# Patient Record
Sex: MALE | Race: WHITE | NOT HISPANIC OR LATINO | URBAN - METROPOLITAN AREA
[De-identification: names, ages, dates, MRNs, and addresses within clinical notes are randomized per-mention and may not be internally consistent; named-entity substitution may affect disease eponyms.]

---

## 2017-08-24 ENCOUNTER — HOSPITAL ENCOUNTER (EMERGENCY)
Facility: MEDICAL CENTER | Age: 22
End: 2017-08-25
Attending: EMERGENCY MEDICINE

## 2017-08-24 ENCOUNTER — APPOINTMENT (OUTPATIENT)
Dept: RADIOLOGY | Facility: MEDICAL CENTER | Age: 22
End: 2017-08-24
Attending: EMERGENCY MEDICINE

## 2017-08-24 DIAGNOSIS — R51.9 NONINTRACTABLE HEADACHE, UNSPECIFIED CHRONICITY PATTERN, UNSPECIFIED HEADACHE TYPE: ICD-10-CM

## 2017-08-24 DIAGNOSIS — R50.9 FEVER, UNSPECIFIED FEVER CAUSE: ICD-10-CM

## 2017-08-24 LAB
ALBUMIN SERPL BCP-MCNC: 3.4 G/DL (ref 3.2–4.9)
ALBUMIN/GLOB SERPL: 1.2 G/DL
ALP SERPL-CCNC: 39 U/L (ref 30–99)
ALT SERPL-CCNC: 20 U/L (ref 2–50)
ANION GAP SERPL CALC-SCNC: 9 MMOL/L (ref 0–11.9)
AST SERPL-CCNC: 37 U/L (ref 12–45)
BASOPHILS # BLD AUTO: 0 % (ref 0–1.8)
BASOPHILS # BLD: 0 K/UL (ref 0–0.12)
BILIRUB SERPL-MCNC: 0.9 MG/DL (ref 0.1–1.5)
BUN SERPL-MCNC: 9 MG/DL (ref 8–22)
CALCIUM SERPL-MCNC: 8.1 MG/DL (ref 8.4–10.2)
CHLORIDE SERPL-SCNC: 100 MMOL/L (ref 96–112)
CK SERPL-CCNC: 77 U/L (ref 0–154)
CO2 SERPL-SCNC: 26 MMOL/L (ref 20–33)
CREAT SERPL-MCNC: 0.75 MG/DL (ref 0.5–1.4)
EOSINOPHIL # BLD AUTO: 0 K/UL (ref 0–0.51)
EOSINOPHIL NFR BLD: 0 % (ref 0–6.9)
ERYTHROCYTE [DISTWIDTH] IN BLOOD BY AUTOMATED COUNT: 41.9 FL (ref 35.9–50)
GFR SERPL CREATININE-BSD FRML MDRD: >60 ML/MIN/1.73 M 2
GLOBULIN SER CALC-MCNC: 2.9 G/DL (ref 1.9–3.5)
GLUCOSE SERPL-MCNC: 96 MG/DL (ref 65–99)
HCT VFR BLD AUTO: 38 % (ref 42–52)
HGB BLD-MCNC: 13.4 G/DL (ref 14–18)
LACTATE BLD-SCNC: 1.19 MMOL/L (ref 0.5–2)
LYMPHOCYTES # BLD AUTO: 1.26 K/UL (ref 1–4.8)
LYMPHOCYTES NFR BLD: 34 % (ref 22–41)
MANUAL DIFF BLD: NORMAL
MCH RBC QN AUTO: 33.8 PG (ref 27–33)
MCHC RBC AUTO-ENTMCNC: 35.3 G/DL (ref 33.7–35.3)
MCV RBC AUTO: 96 FL (ref 81.4–97.8)
MONOCYTES # BLD AUTO: 0.19 K/UL (ref 0–0.85)
MONOCYTES NFR BLD AUTO: 5 % (ref 0–13.4)
NEUTROPHILS # BLD AUTO: 2.26 K/UL (ref 1.82–7.42)
NEUTROPHILS NFR BLD: 57 % (ref 44–72)
NEUTS BAND NFR BLD MANUAL: 4 % (ref 0–10)
NRBC # BLD AUTO: 0 K/UL
NRBC BLD AUTO-RTO: 0 /100 WBC
PLATELET # BLD AUTO: 135 K/UL (ref 164–446)
PLATELET BLD QL SMEAR: NORMAL
PMV BLD AUTO: 9.6 FL (ref 9–12.9)
POTASSIUM SERPL-SCNC: 3.8 MMOL/L (ref 3.6–5.5)
PROT SERPL-MCNC: 6.3 G/DL (ref 6–8.2)
RBC # BLD AUTO: 3.96 M/UL (ref 4.7–6.1)
RBC BLD AUTO: NORMAL
SODIUM SERPL-SCNC: 135 MMOL/L (ref 135–145)
SPECIMEN DRAWN FROM PATIENT: NORMAL
VARIANT LYMPHS BLD QL SMEAR: NORMAL
WBC # BLD AUTO: 3.7 K/UL (ref 4.8–10.8)

## 2017-08-24 PROCEDURE — 85027 COMPLETE CBC AUTOMATED: CPT

## 2017-08-24 PROCEDURE — 36415 COLL VENOUS BLD VENIPUNCTURE: CPT

## 2017-08-24 PROCEDURE — 96375 TX/PRO/DX INJ NEW DRUG ADDON: CPT

## 2017-08-24 PROCEDURE — 82550 ASSAY OF CK (CPK): CPT

## 2017-08-24 PROCEDURE — 700111 HCHG RX REV CODE 636 W/ 250 OVERRIDE (IP): Performed by: EMERGENCY MEDICINE

## 2017-08-24 PROCEDURE — A9270 NON-COVERED ITEM OR SERVICE: HCPCS | Performed by: EMERGENCY MEDICINE

## 2017-08-24 PROCEDURE — 99285 EMERGENCY DEPT VISIT HI MDM: CPT

## 2017-08-24 PROCEDURE — 70450 CT HEAD/BRAIN W/O DYE: CPT

## 2017-08-24 PROCEDURE — 85007 BL SMEAR W/DIFF WBC COUNT: CPT

## 2017-08-24 PROCEDURE — 80053 COMPREHEN METABOLIC PANEL: CPT

## 2017-08-24 PROCEDURE — 83605 ASSAY OF LACTIC ACID: CPT

## 2017-08-24 PROCEDURE — 87040 BLOOD CULTURE FOR BACTERIA: CPT

## 2017-08-24 PROCEDURE — 700102 HCHG RX REV CODE 250 W/ 637 OVERRIDE(OP): Performed by: EMERGENCY MEDICINE

## 2017-08-24 PROCEDURE — 86790 VIRUS ANTIBODY NOS: CPT | Mod: 91

## 2017-08-24 PROCEDURE — 99284 EMERGENCY DEPT VISIT MOD MDM: CPT

## 2017-08-24 RX ORDER — ACETAMINOPHEN 325 MG/1
975 TABLET ORAL ONCE
Status: COMPLETED | OUTPATIENT
Start: 2017-08-24 | End: 2017-08-24

## 2017-08-24 RX ORDER — ONDANSETRON 2 MG/ML
4 INJECTION INTRAMUSCULAR; INTRAVENOUS ONCE
Status: COMPLETED | OUTPATIENT
Start: 2017-08-24 | End: 2017-08-24

## 2017-08-24 RX ADMIN — ONDANSETRON 4 MG: 2 INJECTION INTRAMUSCULAR; INTRAVENOUS at 22:38

## 2017-08-24 RX ADMIN — ACETAMINOPHEN 975 MG: 325 TABLET, FILM COATED ORAL at 22:38

## 2017-08-24 ASSESSMENT — PAIN SCALES - GENERAL: PAINLEVEL_OUTOF10: 5

## 2017-08-24 NOTE — ED AVS SNAPSHOT
Home Care Instructions                                                                                                                Gulshan Rasheed   MRN: [id number]    Department:  [facility], Emergency Dept   Date of Visit:  8/24/2017            [facility], Emergency Dept    [address]    [place] [address]    Phone:  [phone]      You were seen by     Gulshan Christy II, M.D.      Your Diagnosis Was     Fever, unspecified fever cause     R50.9       These are the medications you received during your hospitalization from 08/24/2017 2034 to 08/25/2017 0326     Date/Time Order Dose Route Action    08/24/2017 2238 ondansetron (ZOFRAN) syringe/vial injection 4 mg 4 mg Intravenous Given    08/24/2017 2238 acetaminophen (TYLENOL) tablet 975 mg 975 mg Oral Given    08/25/2017 0138 cefTRIAXone (ROCEPHIN) injection 2 g 2 g Intravenous Given    08/25/2017 0137 metoclopramide (REGLAN) injection 10 mg 10 mg Intravenous Given    08/25/2017 0137 diphenhydrAMINE (BENADRYL) injection 25 mg 25 mg Intravenous Given    08/25/2017 0137 NS infusion 500 mL 500 mL Intravenous New Bag      Follow-up Information     1. Follow up with I have placed a request for our [facility] schedulers to schedule a follow up appointment with one of our medicine doctors within the next 1-2 weeks. .        2. Follow up with [facility], Emergency Dept.    Specialty:  Emergency Medicine    Why:  If symptoms worsen, high fevers, unable to tolerate food or drink, blood in stools, other bleeding symptoms or worsening rash    Contact information    [id number] [id number] [phone]      Medication Information     Review all of your home medications and newly ordered medications with your primary doctor and/or pharmacist as soon as possible. Follow medication instructions as directed by your doctor and/or pharmacist.     Please keep your complete  medication list with you and share with your physician. Update the information when medications are discontinued, doses are changed, or new medications (including over-the-counter products) are added; and carry medication information at all times in the event of emergency situations.               Medication List      ASK your doctor about these medications        Instructions    Morning Afternoon Evening Bedtime    ondansetron 4 MG Tabs tablet   Commonly known as:  ZOFRAN        Take 1 Tab by mouth every 6 hours as needed for Nausea/Vomiting.   Dose:  4 mg                                Procedures and tests performed during your visit     Procedure/Test Number of Times Performed    BLOOD CULTURE 2    CARDIAC MONITORING 1    CBC WITH DIFFERENTIAL 1    COMP METABOLIC PANEL 1    CREATINE KINASE 1    CSF CELL COUNT 2    CSF CULTURE 1    CSF GLUCOSE 1    CSF PROTEIN 1    CT-HEAD W/O 1    DIFFERENTIAL MANUAL 1    ESTIMATED GFR 1    GRAM STAIN 1    HIV RNA ULTRAQUANT 1    HIV-1 (RNA AND DNA) BY PCR, QUAL 1    IV SALINE LOCK 1    LACTIC ACID 1    MORPHOLOGY 1    PLATELET ESTIMATE 1    URINALYSIS CULTURE, IF INDICATED 1    WEST NILE VIRUS ANTIBODY 1        Discharge Instructions       Lumbar Puncture  A lumbar puncture, or spinal tap, is a procedure in which a small amount of the fluid that surrounds the brain and spinal cord is removed and examined. The fluid is called the cerebrospinal fluid. This procedure may be done to:   · Help diagnose various problems, such as meningitis, encephalitis, multiple sclerosis, and AIDS.    · Remove fluid and relieve pressure that occurs with certain types of headaches.    · Look for bleeding within the brain and spinal cord areas (central nervous system).    · Place medicine into the spinal fluid.    LET YOUR HEALTH CARE PROVIDER KNOW ABOUT:  · Any allergies you have.  · All medicines you are taking, including vitamins, herbs, eye drops, creams, and over-the-counter  medicines.  · Previous problems you or members of your family have had with the use of anesthetics.  · Any blood disorders you have.  · Previous surgeries you have had.  · Medical conditions you have.  RISKS AND COMPLICATIONS  Generally, this is a safe procedure. However, as with any procedure, complications can occur. Possible complications include:   · Spinal headache. This is a severe headache that occurs when there is a leak of spinal fluid. A spinal headache causes discomfort but is not dangerous. If it persists, another procedure may be done to treat the headache.  · Bleeding. This most often occurs in people with bleeding disorders. These are disorders in which the blood does not clot normally.    · Infection at the insertion site that can spread to the bone or spinal fluid.   · Formation of a spinal cord tumor (rare).  · Brain herniation or movement of the brain into the spinal cord (rare).  · Inability to move (extremely rare).  BEFORE THE PROCEDURE  · You may have blood tests done. These tests can help tell how well your kidneys and liver are working. They can also show how well your blood clots.    · If you take blood thinners (anticoagulant medicine), ask your health care provider if and when you should stop taking them.    · Your health care provider may order a CT scan of your brain.  · Make arrangements for someone to drive you home after the procedure.      PROCEDURE  · You will be positioned so that the spaces between the bones of the spine (vertebrae) are as wide as possible. This will make it easier to pass the needle into the spinal canal.   · Depending on your age and size, you may lie on your side, curled up with your knees under your chin. Or, you may sit with your head resting on a pillow that is placed at waist level.  · The skin covering the lower back (or lumbar region) will be cleaned.    · The skin may be numbed with medicine.  · You may be given pain medicine or a medicine to help you  "relax (sedative).    · A small needle will be inserted in the skin until it enters the space that contains the spinal fluid. The needle will not enter the spinal cord.    · The spinal fluid will be collected into tubes.    · The needle will be withdrawn, and a bandage will be placed on the site.    AFTER THE PROCEDURE  · You will remain lying down for 1 hour or for as long as your health care provider suggests.    · The spinal fluid will be sent to a laboratory to be examined. The results of the examination may be available before you go home.  · A test, called a culture, may be taken of the spinal fluid if your health care provider thinks you have an infection. If cultures were taken for exam, the results will usually be available in a couple of days.        This information is not intended to replace advice given to you by your health care provider. Make sure you discuss any questions you have with your health care provider.     Document Released: 12/15/2001 Document Revised: 10/08/2014 Document Reviewed: 08/25/2014  [employer] Interactive Patient Education ©2016 [employer]  Viral Syndrome  You or your child has Viral Syndrome. It is the most common infection causing \"colds\" and infections in the nose, throat, sinuses, and breathing tubes. Sometimes the infection causes nausea, vomiting, or diarrhea. The germ that causes the infection is a virus. No antibiotic or other medicine will kill it. There are medicines that you can take to make you or your child more comfortable.   HOME CARE INSTRUCTIONS   · Rest in bed until you start to feel better.   · If you have diarrhea or vomiting, eat small amounts of crackers and toast. Soup is helpful.   · Do not give aspirin or medicine that contains aspirin to children.   · Only take over-the-counter or prescription medicines for pain, discomfort, or fever as directed by your caregiver.   SEEK IMMEDIATE MEDICAL CARE IF:   · You or your child has not improved within one week. "   · You or your child has pain that is not at least partially relieved by over-the-counter medicine.   · Thick, colored mucus or blood is coughed up.   · Discharge from the nose becomes thick yellow or green.   · Diarrhea or vomiting gets worse.   · There is any major change in your or your child's condition.   · You or your child develops a skin rash, stiff neck, severe headache, or are unable to hold down food or fluid.   · You or your child has an oral temperature above 102° F (38.9° C), not controlled by medicine.   · Your baby is older than 3 months with a rectal temperature of 102° F (38.9° C) or higher.   · Your baby is 3 months old or younger with a rectal temperature of 100.4° F (38° C) or higher.   Document Released: 12/03/2007 Document Revised: 03/11/2013 Document Reviewed: 12/03/2008  ExitCare® Patient Information ©2013 [employer].          Patient Information     Patient Information    Following emergency treatment: all patient requiring follow-up care must return either to a private physician or a clinic if your condition worsens before you are able to obtain further medical attention, please return to the emergency room.     Billing Information    At [facility], we work to make the billing process streamlined for our patients.  Our Representatives are here to answer any questions you may have regarding your hospital bill.  If you have insurance coverage and have supplied your insurance information to us, we will submit a claim to your insurer on your behalf.  Should you have any questions regarding your bill, we can be reached online or by phone as follows:  Online: You are able pay your bills online or live chat with our representatives about any billing questions you may have. We are here to help Monday - Friday from 8:00am to 7:30pm and 9:00am - 12:00pm on Saturdays.  Please visit [email or web address]  for more information.   Phone:  [phone] or  [phone]    Please note that your emergency physician, surgeon, pathologist, radiologist, anesthesiologist, and other specialists are not employed by [facility] and will therefore bill separately for their services.  Please contact them directly for any questions concerning their bills at the numbers below:     Emergency Physician Services:  [phone]  [facility]:  [phone]  [facility]:  [phone]  [facility]:  [phone]    1. Your final bill may vary from the amount quoted upon discharge if all procedures are not complete at that time, or if your doctor has additional procedures of which we are not aware. You will receive an additional bill if you return to the Emergency Department at [facility] for suture removal regardless of the facility of which the sutures were placed.     2. Please arrange for settlement of this account at the emergency registration.    3. All self-pay accounts are due in full at the time of treatment.  If you are unable to meet this obligation then payment is expected within 4-5 days.     4. If you have had radiology studies (CT, X-ray, Ultrasound, MRI), you have received a preliminary result during your emergency department visit. Please contact the radiology department [phone] to receive a copy of your final result. Please discuss the Final result with your primary physician or with the follow up physician provided.     Crisis Hotline:  [place] Crisis Hotline:  6-790-XOLFKJY or 1-982.529.7880  [place] Crisis Hotline:    1-267.641.5238 or 903-326-9615         ED Discharge Follow Up Questions    1. In order to provide you with very good care, we would like to follow up with a phone call in the next few days.  May we have your permission to contact you?     YES /  NO    2. What is the best phone number to call you? (       )_____-__________    3. What is the best time to call you?      Morning  /  Afternoon  /   Evening                   Patient Signature:  ____________________________________________________________    Date:  ____________________________________________________________

## 2017-08-24 NOTE — ED AVS SNAPSHOT
8/25/2017    Gulshan Rasheed  [address] [address]  [place] [address]    Dear Gulshan:    [facility] wants to ensure your discharge home is safe and you or your loved ones have had all of your questions answered regarding your care after you leave the hospital.    Below is a list of resources and contact information should you have any questions regarding your hospital stay, follow-up instructions, or active medical symptoms.    Questions or Concerns Regarding… Contact   Medical Questions Related to Your Discharge  (7 days a week, 8am-5pm) Contact a Nurse Care Coordinator   [phone]   Medical Questions Not Related to Your Discharge  (24 hours a day / 7 days a week)  Contact the Nurse Health Line   [phone]    Medications or Discharge Instructions Refer to your discharge packet   or contact your [facility] Primary Care Provider   [phone]   Follow-up Appointment(s) Schedule your appointment via [employer]   or contact Scheduling [phone]   Billing Review your statement via [employer]  or contact Billing [phone]   Medical Records Review your records via [employer]   or contact Medical Records [phone]     You may receive a telephone call within two days of discharge. This call is to make certain you understand your discharge instructions and have the opportunity to have any questions answered. You can also easily access your medical information, test results and upcoming appointments via the [employer] free online health management tool. You can learn more and sign up at [email or web address]. For assistance setting up your [employer] account, please call [phone].    Once again, we want to ensure your discharge home is safe and that you have a clear understanding of any next steps in your care. If you have any questions or concerns, please do not hesitate to contact us, we are here for you. Thank you for choosing [facility] for your healthcare needs.    Sincerely,    Your [facility] Healthcare Team

## 2017-08-24 NOTE — ED AVS SNAPSHOT
Walkabout Access Code: [email or web address]  Expires: 9/24/2017  3:25 AM    Your email address is not on file at [employer].  Email Addresses are required for you to sign up for Walkabout, please contact [phone] to verify your personal information and to provide your email address prior to attempting to register for Walkabout.    Gulshan Rasheed  [address] [place] [address]    Walkabout  A secure, online tool to manage your health information     [employer]’s Walkabout® is a secure, online tool that connects you to your personalized health information from the privacy of your home -- day or night - making it very easy for you to manage your healthcare. Once the activation process is completed, you can even access your medical information using the Walkabout brandy, which is available for free in the Apple Brandy store or Google Play store.     To learn more about Walkabout, visit [email or web address]    There are two levels of access available (as shown below):   My Chart Features  [facility] Primary Care Doctor [facility]  Specialists [facility]  Urgent  Care Non-[facility] Primary Care Doctor   Email your healthcare team securely and privately 24/7 X X X    Manage appointments: schedule your next appointment; view details of past/upcoming appointments X      Request prescription refills. X      View recent personal medical records, including lab and immunizations X X X X   View health record, including health history, allergies, medications X X X X   Read reports about your outpatient visits, procedures, consult and ER notes X X X X   See your discharge summary, which is a recap of your hospital and/or ER visit that includes your diagnosis, lab results, and care plan X X  X     How to register for SharePlowt:  Once your e-mail address has been verified, follow the following steps to sign up for [employer]t.     1. Go to  [email or web address]  2. Click on the Sign Up Now box, which takes you to the New Member Sign Up page. You  will need to provide the following information:  a. Enter your [employer] Access Code exactly as it appears at the top of this page. (You will not need to use this code after you’ve completed the sign-up process. If you do not sign up before the expiration date, you must request a new code.)   b. Enter your date of birth.   c. Enter your home email address.   d. Click Submit, and follow the next screen’s instructions.  3. Create a [employer] ID. This will be your [employer] login ID and cannot be changed, so think of one that is secure and easy to remember.  4. Create a [employer] password. You can change your password at any time.  5. Enter your Password Reset Question and Answer. This can be used at a later time if you forget your password.   6. Enter your e-mail address. This allows you to receive e-mail notifications when new information is available in [employer].  7. Click Sign Up. You can now view your health information.    For assistance activating your [employer] account, call [phone]

## 2017-08-25 ENCOUNTER — PATIENT OUTREACH (OUTPATIENT)
Dept: HEALTH INFORMATION MANAGEMENT | Facility: OTHER | Age: 22
End: 2017-08-25

## 2017-08-25 VITALS
BODY MASS INDEX: 20.08 KG/M2 | HEART RATE: 62 BPM | RESPIRATION RATE: 17 BRPM | TEMPERATURE: 98.6 F | WEIGHT: 132.5 LBS | HEIGHT: 68 IN | DIASTOLIC BLOOD PRESSURE: 66 MMHG | SYSTOLIC BLOOD PRESSURE: 115 MMHG | OXYGEN SATURATION: 96 %

## 2017-08-25 LAB
APPEARANCE UR: CLEAR
BILIRUB UR QL STRIP.AUTO: NEGATIVE
BURR CELLS/RBC NFR CSF MANUAL: 0 %
BURR CELLS/RBC NFR CSF MANUAL: 0 %
CLARITY CSF: CLEAR
CLARITY CSF: CLEAR
COLOR CSF: COLORLESS
COLOR CSF: COLORLESS
COLOR SPUN CSF: COLORLESS
COLOR SPUN CSF: COLORLESS
COLOR UR: YELLOW
CULTURE IF INDICATED INDCX: NO UA CULTURE
GLUCOSE CSF-MCNC: 54 MG/DL (ref 40–80)
GLUCOSE UR STRIP.AUTO-MCNC: NEGATIVE MG/DL
GRAM STN SPEC: NORMAL
KETONES UR STRIP.AUTO-MCNC: 15 MG/DL
LEUKOCYTE ESTERASE UR QL STRIP.AUTO: NEGATIVE
LYMPHOCYTES NFR CSF: 88 %
MICRO URNS: ABNORMAL
MONONUC CELLS NFR CSF: 12 %
NITRITE UR QL STRIP.AUTO: NEGATIVE
PH UR STRIP.AUTO: 6.5 [PH]
PROT CSF-MCNC: 22 MG/DL (ref 15–45)
PROT UR QL STRIP: NEGATIVE MG/DL
RBC # CSF: 0 CELLS/UL
RBC # CSF: 1 CELLS/UL
RBC UR QL AUTO: NEGATIVE
SIGNIFICANT IND 70042: NORMAL
SITE SITE: NORMAL
SOURCE SOURCE: NORMAL
SP GR UR STRIP.AUTO: <=1.005
SPECIMEN VOL CSF: 5 ML
SPECIMEN VOL CSF: 5 ML
TUBE # CSF: 1
TUBE # CSF: 4
WBC # CSF: 2 CELLS/UL (ref 0–10)
WBC # CSF: 3 CELLS/UL (ref 0–10)

## 2017-08-25 PROCEDURE — 87205 SMEAR GRAM STAIN: CPT

## 2017-08-25 PROCEDURE — 62270 DX LMBR SPI PNXR: CPT

## 2017-08-25 PROCEDURE — 96375 TX/PRO/DX INJ NEW DRUG ADDON: CPT

## 2017-08-25 PROCEDURE — 87070 CULTURE OTHR SPECIMN AEROBIC: CPT

## 2017-08-25 PROCEDURE — 87535 HIV-1 PROBE&REVERSE TRNSCRPJ: CPT

## 2017-08-25 PROCEDURE — 96365 THER/PROPH/DIAG IV INF INIT: CPT

## 2017-08-25 PROCEDURE — 700111 HCHG RX REV CODE 636 W/ 250 OVERRIDE (IP): Performed by: EMERGENCY MEDICINE

## 2017-08-25 PROCEDURE — 89051 BODY FLUID CELL COUNT: CPT

## 2017-08-25 PROCEDURE — 84157 ASSAY OF PROTEIN OTHER: CPT

## 2017-08-25 PROCEDURE — 700105 HCHG RX REV CODE 258: Performed by: EMERGENCY MEDICINE

## 2017-08-25 PROCEDURE — 82945 GLUCOSE OTHER FLUID: CPT

## 2017-08-25 PROCEDURE — 81003 URINALYSIS AUTO W/O SCOPE: CPT

## 2017-08-25 PROCEDURE — 87536 HIV-1 QUANT&REVRSE TRNSCRPJ: CPT

## 2017-08-25 RX ORDER — SODIUM CHLORIDE 9 MG/ML
500 INJECTION, SOLUTION INTRAVENOUS ONCE
Status: COMPLETED | OUTPATIENT
Start: 2017-08-25 | End: 2017-08-25

## 2017-08-25 RX ORDER — METOCLOPRAMIDE HYDROCHLORIDE 5 MG/ML
10 INJECTION INTRAMUSCULAR; INTRAVENOUS ONCE
Status: COMPLETED | OUTPATIENT
Start: 2017-08-25 | End: 2017-08-25

## 2017-08-25 RX ORDER — DIPHENHYDRAMINE HYDROCHLORIDE 50 MG/ML
25 INJECTION INTRAMUSCULAR; INTRAVENOUS ONCE
Status: COMPLETED | OUTPATIENT
Start: 2017-08-25 | End: 2017-08-25

## 2017-08-25 RX ORDER — VANCOMYCIN HYDROCHLORIDE 500 MG/10ML
2 INJECTION, POWDER, LYOPHILIZED, FOR SOLUTION INTRAVENOUS ONCE
Status: DISCONTINUED | OUTPATIENT
Start: 2017-08-25 | End: 2017-08-25

## 2017-08-25 RX ORDER — CEFTRIAXONE 2 G/1
2 INJECTION, POWDER, FOR SOLUTION INTRAMUSCULAR; INTRAVENOUS ONCE
Status: COMPLETED | OUTPATIENT
Start: 2017-08-25 | End: 2017-08-25

## 2017-08-25 RX ADMIN — SODIUM CHLORIDE 500 ML: 9 INJECTION, SOLUTION INTRAVENOUS at 01:37

## 2017-08-25 RX ADMIN — METOCLOPRAMIDE 10 MG: 5 INJECTION, SOLUTION INTRAMUSCULAR; INTRAVENOUS at 01:37

## 2017-08-25 RX ADMIN — CEFTRIAXONE 2 G: 2 INJECTION, POWDER, FOR SOLUTION INTRAMUSCULAR; INTRAVENOUS at 01:38

## 2017-08-25 RX ADMIN — DIPHENHYDRAMINE HYDROCHLORIDE 25 MG: 50 INJECTION, SOLUTION INTRAMUSCULAR; INTRAVENOUS at 01:37

## 2017-08-25 ASSESSMENT — ENCOUNTER SYMPTOMS
LOSS OF CONSCIOUSNESS: 0
COUGH: 0
HEADACHES: 1
SHORTNESS OF BREATH: 0
CHILLS: 1
FEVER: 1
NAUSEA: 1
DIZZINESS: 0
NECK PAIN: 1
EYE PAIN: 0
SORE THROAT: 0
FOCAL WEAKNESS: 0
VOMITING: 1
BACK PAIN: 0
PHOTOPHOBIA: 1

## 2017-08-25 NOTE — ED NOTES
"Chief Complaint   Patient presents with   • Fever     101f  yesterday per pt    • N/V     intermittent vomiting . Reports single episode Wednesday of vomiting blood.    • Generalized Body Aches     x 1 week    • Abdominal Pain     Beginning approx 2100. \" maybe i have constipation\".    • Bloody Stools     Single \" black\" BM yesterday.        "

## 2017-08-25 NOTE — ED PROVIDER NOTES
"  ED Provider Note    Scribed for VIDAL Goyal II* by Gulshan Christy II. 8/24/2017  10:31 PM    Means of Arrival: POV  History obtained by: patient and mother  Limitations: none    CHIEF COMPLAINT  Chief Complaint   Patient presents with   • Fever     101f  yesterday per pt    • N/V     intermittent vomiting . Reports single episode Wednesday of vomiting blood.    • Generalized Body Aches     x 1 week    • Abdominal Pain     Beginning approx 2100. \" maybe i have constipation\".    • Bloody Stools     Single \" black\" BM yesterday.        HPI  Gulshan Rasheed is a 22 y.o. Male otherwise healthy who presents with concerns of fever, headache, nausea, emesis, body aches since 4 days ago. He says he was camping over the weekend. He says he was sunburned and initially thought that body aches were from sunburn and being outside camping over the weekend. However over the next couple days he had a headache that would not improve with Tylenol or Motrin. Reports headache getting worse. He says he was unable to tolerate by mouth for the past 3 days. He went to [facility] emergency department yesterday. He says \"they didn't do anything for me there.\" When probed further, he says that they did do blood work and gave him 2 L of fluid. He was not told what the cause of his symptoms were. He describes headache has diffuse more intense in the back radiating to his neck, constant, throbbing.    REVIEW OF SYSTEMS  Review of Systems   Constitutional: Positive for fever, chills and malaise/fatigue.   HENT: Negative for sore throat.    Eyes: Positive for photophobia. Negative for pain.   Respiratory: Negative for cough and shortness of breath.    Cardiovascular: Negative for chest pain.   Gastrointestinal: Positive for nausea and vomiting.        Reports streaks of blood in emesis earlier this week     Musculoskeletal: Positive for joint pain and neck pain. Negative for back pain.   Skin: Positive for rash.        Sunburn on " "back     Neurological: Positive for headaches. Negative for dizziness, focal weakness and loss of consciousness.   Psychiatric/Behavioral:        No confusion     All other systems reviewed and are negative.    See HPI for further details.    PAST MEDICAL HISTORY       SOCIAL HISTORY  Social History     Social History Main Topics   • Smoking status: Current Every Day Smoker -- 1.00 packs/day   • Smokeless tobacco: Not on file   • Alcohol Use: Yes      Comment: few drinks weekly   • Drug Use: No   • Sexual Activity: Not on file       SURGICAL HISTORY  patient denies any surgical history    CURRENT MEDICATIONS  Home Medications     **Home medications have not yet been reviewed for this encounter**          ALLERGIES  No Known Allergies    PHYSICAL EXAM  VITAL SIGNS: /66 mmHg  Pulse 68  Temp(Src) 37 °C (98.6 °F)  Resp 17  Ht 1.727 m (5' 7.99\")  Wt 60.1 kg (132 lb 7.9 oz)  BMI 20.15 kg/m2    Pulse ox interpretation: I interpret this pulse ox as normal.  Constitutional: Alert in no apparent distress.  HENT: No signs of trauma, Bilateral external ears normal, Nose normal.   Eyes: Pupils are equal and reactive, Conjunctiva normal, Non-icteric. Photophobia.  Neck: Pain with range of motion of the neck.,  No stridor.   Lymphatic: Posterior cervical lymphadenopathy.  Cardiovascular: Regular rate and rhythm, no murmurs.   Thorax & Lungs: Normal breath sounds, No respiratory distress, No wheezing, No chest tenderness.   Abdomen: Bowel sounds normal, Soft, No tenderness, No masses, No pulsatile masses. No peritoneal signs. Occult stool negative (control good).  Skin: Warm, Dry, No erythema. Papular rash on right side of face, red, does not latha. He also has a fine and faint papular rash is red not raised on the lateral sides of flank. Also with superficial sunburn on back.   Back: No bony tenderness, No CVA tenderness.   Extremities: Intact distal pulses, No edema, No tenderness, No cyanosis.  Musculoskeletal: Good " range of motion in all major joints. No tenderness to palpation or major deformities noted.   Neurologic: Alert , Normal motor function, Normal sensory function, No focal deficits noted.   Psychiatric: Affect normal, Judgment normal, Mood normal.     DIAGNOSTIC STUDIES / PROCEDURES    EKG  none    LABS  Pertinent Labs & Imaging studies reviewed. (See chart for details)    RADIOLOGY  Pertinent Labs & Imaging studies reviewed. (See chart for details)    COURSE & MEDICAL DECISION MAKING  Pertinent Labs & Imaging studies reviewed. (See chart for details)    10:31 PM This is a 22 y.o. male otherwise healthy who presents with complaints of 4 days of worsening headache, fevers, body aches and the differential diagnosis includes viral syndrome, meningitis, migraine headache, rhabdomyolysis, HIV viral prodrome. Concerning combinations of symptoms that included rash, headache, fever. Ordered for CBC, CMP, CK, lactic acid, blood cultures, CT head, planned for LP to evaluate. Patient will be treated with Zofran and Tylenol for his symptoms.     1:51 AM CT within normal limits. CBC abnormal. White blood count 3.7, mildly low H/H, low platelet 135. Bicarb is normal. Lactic acid normal. LP was performed see procedure note. CSF results pending.    LUMBAR PUNCTURE PROCEDURE NOTE  Patient identification was confirmed and consent was obtained. The procedure  was performed at 100 by Dr. Christy.  Indication: headache, neck pain, fevers  Puncture Site: L4/L5  Sterile procedures observed yes  Patient position: left lateral decubitus  Needle size: 20  Anesthetic used (type and amt): 2% lidocaine with epinephrine, 5cc  Intracranial pressure: 19  Amount CSF collected: 6cc  Color of CSF collected: crystal clear  Site anesthetized, puncture made at indicated site, CSF collected and sent for further lab testing (see lab ). Pt tolerated procedure well without complications. Instructions for care discussed verbally and pt provided  with additional written instructions for homecare and f/u.    3:24 AM  CSF results reassuring. Only 3 white cells and 1 red cell. No organisms on Gram stain. Protein and glucose within normal limits. His headache is drastically improved after treatment with Reglan and Benadryl. I discussed abnormalities on CBC. I told him I sent a serum assay West Nile virus and HIV. I do not think he meets indications for admissions given his vital signs are normal here, his blood counts are not critically low, and he has a negative lumbar puncture.    I suspect this presentation is most likely due to a viral syndrome, I just do not know what type of virus. I looked at the rash on his face a 2nd time tonight and not convinced that this isn't rash from a viral infection. Has the parents they could be more consistent with acne. Rash that was briefly documented at his flanks is no longer visible. I called [facility] scheduling line and request that he be set up with a primary care appointment for next week for reassessment of a normal CBC and viral studies sent.    The patient will not drink alcohol nor drive with prescribed medications. The patient will return for worsening symptoms and is stable at the time of discharge. The patient verbalizes understanding and will comply.    FINAL IMPRESSION  1. Viral syndrome

## 2017-08-25 NOTE — DISCHARGE INSTRUCTIONS
Lumbar Puncture  A lumbar puncture, or spinal tap, is a procedure in which a small amount of the fluid that surrounds the brain and spinal cord is removed and examined. The fluid is called the cerebrospinal fluid. This procedure may be done to:   · Help diagnose various problems, such as meningitis, encephalitis, multiple sclerosis, and AIDS.    · Remove fluid and relieve pressure that occurs with certain types of headaches.    · Look for bleeding within the brain and spinal cord areas (central nervous system).    · Place medicine into the spinal fluid.    LET YOUR HEALTH CARE PROVIDER KNOW ABOUT:  · Any allergies you have.  · All medicines you are taking, including vitamins, herbs, eye drops, creams, and over-the-counter medicines.  · Previous problems you or members of your family have had with the use of anesthetics.  · Any blood disorders you have.  · Previous surgeries you have had.  · Medical conditions you have.  RISKS AND COMPLICATIONS  Generally, this is a safe procedure. However, as with any procedure, complications can occur. Possible complications include:   · Spinal headache. This is a severe headache that occurs when there is a leak of spinal fluid. A spinal headache causes discomfort but is not dangerous. If it persists, another procedure may be done to treat the headache.  · Bleeding. This most often occurs in people with bleeding disorders. These are disorders in which the blood does not clot normally.    · Infection at the insertion site that can spread to the bone or spinal fluid.   · Formation of a spinal cord tumor (rare).  · Brain herniation or movement of the brain into the spinal cord (rare).  · Inability to move (extremely rare).  BEFORE THE PROCEDURE  · You may have blood tests done. These tests can help tell how well your kidneys and liver are working. They can also show how well your blood clots.    · If you take blood thinners (anticoagulant medicine), ask your health care provider if  "and when you should stop taking them.    · Your health care provider may order a CT scan of your brain.  · Make arrangements for someone to drive you home after the procedure.      PROCEDURE  · You will be positioned so that the spaces between the bones of the spine (vertebrae) are as wide as possible. This will make it easier to pass the needle into the spinal canal.   · Depending on your age and size, you may lie on your side, curled up with your knees under your chin. Or, you may sit with your head resting on a pillow that is placed at waist level.  · The skin covering the lower back (or lumbar region) will be cleaned.    · The skin may be numbed with medicine.  · You may be given pain medicine or a medicine to help you relax (sedative).    · A small needle will be inserted in the skin until it enters the space that contains the spinal fluid. The needle will not enter the spinal cord.    · The spinal fluid will be collected into tubes.    · The needle will be withdrawn, and a bandage will be placed on the site.    AFTER THE PROCEDURE  · You will remain lying down for 1 hour or for as long as your health care provider suggests.    · The spinal fluid will be sent to a laboratory to be examined. The results of the examination may be available before you go home.  · A test, called a culture, may be taken of the spinal fluid if your health care provider thinks you have an infection. If cultures were taken for exam, the results will usually be available in a couple of days.        This information is not intended to replace advice given to you by your health care provider. Make sure you discuss any questions you have with your health care provider.     Document Released: 12/15/2001 Document Revised: 10/08/2014 Document Reviewed: 08/25/2014  [employer] Interactive Patient Education ©2016 [employer]  Viral Syndrome  You or your child has Viral Syndrome. It is the most common infection causing \"colds\" and infections in " the nose, throat, sinuses, and breathing tubes. Sometimes the infection causes nausea, vomiting, or diarrhea. The germ that causes the infection is a virus. No antibiotic or other medicine will kill it. There are medicines that you can take to make you or your child more comfortable.   HOME CARE INSTRUCTIONS   · Rest in bed until you start to feel better.   · If you have diarrhea or vomiting, eat small amounts of crackers and toast. Soup is helpful.   · Do not give aspirin or medicine that contains aspirin to children.   · Only take over-the-counter or prescription medicines for pain, discomfort, or fever as directed by your caregiver.   SEEK IMMEDIATE MEDICAL CARE IF:   · You or your child has not improved within one week.   · You or your child has pain that is not at least partially relieved by over-the-counter medicine.   · Thick, colored mucus or blood is coughed up.   · Discharge from the nose becomes thick yellow or green.   · Diarrhea or vomiting gets worse.   · There is any major change in your or your child's condition.   · You or your child develops a skin rash, stiff neck, severe headache, or are unable to hold down food or fluid.   · You or your child has an oral temperature above 102° F (38.9° C), not controlled by medicine.   · Your baby is older than 3 months with a rectal temperature of 102° F (38.9° C) or higher.   · Your baby is 3 months old or younger with a rectal temperature of 100.4° F (38° C) or higher.   Document Released: 12/03/2007 Document Revised: 03/11/2013 Document Reviewed: 12/03/2008  [employer]® Patient Information ©2013 [employer].

## 2017-08-26 LAB — HIV 1 PRO DNA BLD QL NAA+PROBE: DETECTED

## 2017-08-28 LAB
BACTERIA CSF CULT: NORMAL
GRAM STN SPEC: NORMAL
SIGNIFICANT IND 70042: NORMAL
SITE SITE: NORMAL
SOURCE SOURCE: NORMAL

## 2017-08-28 NOTE — ED NOTES
"ED Positive Culture Follow-up/Notification Note:    Date: 8/28/17     Patient seen in the ED on 8/24/2017 for fever, headache, nausea, emesis and body aches x 4 days.   1. Fever, unspecified fever cause    2. Nonintractable headache, unspecified chronicity pattern, unspecified headache type       Discharge Medication List as of 8/25/2017  3:26 AM          Allergies: Review of patient's allergies indicates no known allergies.     Final cultures:   Results     Procedure Component Value Units Date/Time    CSF CULTURE [[id number]] Collected:  08/25/17 0120    Order Status:  Completed Specimen:  CSF from Tap Updated:  08/28/17 0853     Gram Stain Result No organisms seen.     Significant Indicator NEG     Source CSF     Site TAP     CSF Culture No growth at 72 hours.    BLOOD CULTURE [[id number]] Collected:  08/24/17 2300    Order Status:  Completed Specimen:  Blood from Peripheral Updated:  08/25/17 0711     Significant Indicator NEG     Source BLD     Site PERIPHERAL     Blood Culture --     No Growth    Note: Blood cultures are incubated for 5 days and  are monitored continuously.Positive blood cultures  are called to the RN and reported as soon as  they are identified.  Blood culture testing and Gram stain, if indicated, are  performed at [facility], [address] [place] [address].  Positive blood cultures are  sent to [facility], [address] [place] [address], for organism identification and  susceptibility testing.      Narrative:       Per Hospital Policy: Only change Specimen Src: to \"Line\" if  specified by physician order.    BLOOD CULTURE [[id number]] Collected:  08/24/17 2238    Order Status:  Completed Specimen:  Blood from Peripheral Updated:  08/25/17 0711     Significant Indicator NEG     Source BLD     Site PERIPHERAL     Blood Culture --     No Growth    Note: Blood cultures are incubated for 5 days and  are monitored continuously.Positive blood " "cultures  are called to the RN and reported as soon as  they are identified.  Blood culture testing and Gram stain, if indicated, are  performed at [facility], [address] [place] [address].  Positive blood cultures are  sent to [facility], [address] [place] [address], for organism identification and  susceptibility testing.      Narrative:       Per Hospital Policy: Only change Specimen Src: to \"Line\" if  specified by physician order.    URINALYSIS CULTURE, IF INDICATED [[id number]]  (Abnormal) Collected:  08/25/17 0250    Order Status:  Completed Specimen:  Urine from Urine, Clean Catch Updated:  08/25/17 0258     Color Yellow     Character Clear     Specific Gravity <=1.005     Ph 6.5     Glucose Negative mg/dL      Ketones 15 (A) mg/dL      Protein Negative mg/dL      Bilirubin Negative     Nitrite Negative     Leukocyte Esterase Negative     Occult Blood Negative     Micro Urine Req see below     Comment: Microscopic examination not performed when specimen is clear  and chemically negative for protein, blood, leukocyte esterase  and nitrite.          Culture Indicated No UA Culture     GRAM STAIN [[id number]] Collected:  08/25/17 0120    Order Status:  Completed Specimen:  CSF Updated:  08/25/17 0237     Significant Indicator .     Source CSF     Site TAP     Gram Stain Result No organisms seen.    GRAM STAIN ONLY [[id number]]     Order Status:  Canceled Specimen:  CSF from Tap           Plan:   HIV PCR positive. No HIV antibody testing done. HIV RNA ultraquantitative test pending. No blood in the lab remaining to order 4th Generation Ag/Ab test.     I have spoken with Dr. Morales and Janie Mock who covers [facility] ([phone]) regarding this case. They recommend contacting the patient to discuss preliminary result and encourage follow up with the [facility] HIV clinic.     I have contacted the patient who states that he does not utilized IV drugs, " has not had any blood transfusions, but does have sex with men.  He states he was last tested in March 2017 and was negative. He has been provided with the preliminary HIV test and encouraged to schedule an appointment with the [facility] ([phone]).  He is aware that the viral load testing is not yet back and that I will call him back with this result.     Jazzmine Palomino

## 2017-08-30 LAB
BACTERIA BLD CULT: NORMAL
BACTERIA BLD CULT: NORMAL
SIGNIFICANT IND 70042: NORMAL
SIGNIFICANT IND 70042: NORMAL
SITE SITE: NORMAL
SITE SITE: NORMAL
SOURCE SOURCE: NORMAL
SOURCE SOURCE: NORMAL

## 2017-08-31 LAB — WNV IGM SER QL IA: NORMAL

## 2020-12-04 ENCOUNTER — HOSPITAL ENCOUNTER (OUTPATIENT)
Dept: LAB | Facility: MEDICAL CENTER | Age: 25
End: 2020-12-04
Attending: STUDENT IN AN ORGANIZED HEALTH CARE EDUCATION/TRAINING PROGRAM
Payer: MEDICAID

## 2020-12-04 LAB
ALBUMIN SERPL BCP-MCNC: 4.6 G/DL (ref 3.2–4.9)
ALBUMIN/GLOB SERPL: 2.1 G/DL
ALP SERPL-CCNC: 65 U/L (ref 30–99)
ALT SERPL-CCNC: 21 U/L (ref 2–50)
ANION GAP SERPL CALC-SCNC: 8 MMOL/L (ref 7–16)
AST SERPL-CCNC: 24 U/L (ref 12–45)
BASOPHILS # BLD AUTO: 0.5 % (ref 0–1.8)
BASOPHILS # BLD: 0.03 K/UL (ref 0–0.12)
BILIRUB SERPL-MCNC: 1.8 MG/DL (ref 0.1–1.5)
BUN SERPL-MCNC: 17 MG/DL (ref 8–22)
CALCIUM SERPL-MCNC: 9.4 MG/DL (ref 8.5–10.5)
CHLORIDE SERPL-SCNC: 104 MMOL/L (ref 96–112)
CO2 SERPL-SCNC: 26 MMOL/L (ref 20–33)
CREAT SERPL-MCNC: 0.93 MG/DL (ref 0.5–1.4)
EOSINOPHIL # BLD AUTO: 0.23 K/UL (ref 0–0.51)
EOSINOPHIL NFR BLD: 3.7 % (ref 0–6.9)
ERYTHROCYTE [DISTWIDTH] IN BLOOD BY AUTOMATED COUNT: 44.2 FL (ref 35.9–50)
GLOBULIN SER CALC-MCNC: 2.2 G/DL (ref 1.9–3.5)
GLUCOSE SERPL-MCNC: 108 MG/DL (ref 65–99)
HBV SURFACE AB SERPL IA-ACNC: 138 MIU/ML (ref 0–10)
HCT VFR BLD AUTO: 48.7 % (ref 42–52)
HGB BLD-MCNC: 17.5 G/DL (ref 14–18)
IMM GRANULOCYTES # BLD AUTO: 0 K/UL (ref 0–0.11)
IMM GRANULOCYTES NFR BLD AUTO: 0 % (ref 0–0.9)
LYMPHOCYTES # BLD AUTO: 1.97 K/UL (ref 1–4.8)
LYMPHOCYTES NFR BLD: 31.8 % (ref 22–41)
MCH RBC QN AUTO: 35.8 PG (ref 27–33)
MCHC RBC AUTO-ENTMCNC: 35.9 G/DL (ref 33.7–35.3)
MCV RBC AUTO: 99.6 FL (ref 81.4–97.8)
MONOCYTES # BLD AUTO: 0.47 K/UL (ref 0–0.85)
MONOCYTES NFR BLD AUTO: 7.6 % (ref 0–13.4)
NEUTROPHILS # BLD AUTO: 3.5 K/UL (ref 1.82–7.42)
NEUTROPHILS NFR BLD: 56.4 % (ref 44–72)
NRBC # BLD AUTO: 0 K/UL
NRBC BLD-RTO: 0 /100 WBC
PLATELET # BLD AUTO: 224 K/UL (ref 164–446)
PMV BLD AUTO: 10.2 FL (ref 9–12.9)
POTASSIUM SERPL-SCNC: 4 MMOL/L (ref 3.6–5.5)
PROT SERPL-MCNC: 6.8 G/DL (ref 6–8.2)
RBC # BLD AUTO: 4.89 M/UL (ref 4.7–6.1)
SODIUM SERPL-SCNC: 138 MMOL/L (ref 135–145)
WBC # BLD AUTO: 6.2 K/UL (ref 4.8–10.8)

## 2020-12-04 PROCEDURE — 36415 COLL VENOUS BLD VENIPUNCTURE: CPT

## 2020-12-04 PROCEDURE — 86592 SYPHILIS TEST NON-TREP QUAL: CPT

## 2020-12-04 PROCEDURE — 87491 CHLMYD TRACH DNA AMP PROBE: CPT

## 2020-12-04 PROCEDURE — 85025 COMPLETE CBC W/AUTO DIFF WBC: CPT

## 2020-12-04 PROCEDURE — 86706 HEP B SURFACE ANTIBODY: CPT

## 2020-12-04 PROCEDURE — 87536 HIV-1 QUANT&REVRSE TRNSCRPJ: CPT

## 2020-12-04 PROCEDURE — 86361 T CELL ABSOLUTE COUNT: CPT

## 2020-12-04 PROCEDURE — 86708 HEPATITIS A ANTIBODY: CPT

## 2020-12-04 PROCEDURE — 87591 N.GONORRHOEAE DNA AMP PROB: CPT

## 2020-12-04 PROCEDURE — 80053 COMPREHEN METABOLIC PANEL: CPT

## 2020-12-04 PROCEDURE — 86780 TREPONEMA PALLIDUM: CPT

## 2020-12-05 LAB
C TRACH DNA SPEC QL NAA+PROBE: NEGATIVE
N GONORRHOEA DNA SPEC QL NAA+PROBE: NEGATIVE
RPR SER QL: NON REACTIVE
SPECIMEN SOURCE: NORMAL
TREPONEMA PALLIDUM IGG+IGM AB [PRESENCE] IN SERUM OR PLASMA BY IMMUNOASSAY: REACTIVE

## 2020-12-06 LAB
CD3+CD4+ CELLS # BLD: 1091 CELLS/UL (ref 430–1800)
CD3+CD4+ CELLS NFR BLD: 53 % (ref 32–64)
HAV AB SER QL IA: POSITIVE
IMMUNODEFICIENCY MARKERS SPEC-IMP: NORMAL

## 2020-12-18 LAB — T PALLIDUM AB SER QL AGGL: REACTIVE

## 2020-12-23 LAB
HIV-1 NAAT (COPIES/ML) L204479A: NOT DETECTED CPY/ML
HIV-1 NAAT (LOG COPIES/ML) L295410: NOT DETECTED LOG CPY/ML
HIV1 RNA SERPL QL NAA+PROBE: NOT DETECTED

## 2022-03-14 DIAGNOSIS — A51.5 SYPHILIS, EARLY LATENT: ICD-10-CM

## 2022-03-14 DIAGNOSIS — Z72.51 HIGH RISK SEXUAL BEHAVIOR, UNSPECIFIED TYPE: ICD-10-CM

## 2022-03-14 DIAGNOSIS — Z21 ASYMPTOMATIC HUMAN IMMUNODEFICIENCY VIRUS INFECTION (HCC): ICD-10-CM

## 2022-03-14 DIAGNOSIS — D83.1: ICD-10-CM

## 2022-03-14 DIAGNOSIS — Z51.81 ENCOUNTER FOR THERAPEUTIC DRUG MONITORING: ICD-10-CM

## 2022-03-14 RX ORDER — BICTEGRAVIR SODIUM, EMTRICITABINE, AND TENOFOVIR ALAFENAMIDE FUMARATE 50; 200; 25 MG/1; MG/1; MG/1
TABLET ORAL
COMMUNITY
Start: 2020-09-16 | End: 2022-03-14 | Stop reason: SDUPTHER

## 2022-03-14 NOTE — TELEPHONE ENCOUNTER
VOICEMAIL  1. Caller Name: Gulshan Rasheed                      Call Back Number: [phone]    2. Message: pt lm requesting a refill on his Biktarvy, he has been out for 5-6 days now.    3. Patient approves office to leave a detailed voicemail/MyChart message: yes

## 2022-03-15 RX ORDER — BICTEGRAVIR SODIUM, EMTRICITABINE, AND TENOFOVIR ALAFENAMIDE FUMARATE 50; 200; 25 MG/1; MG/1; MG/1
1 TABLET ORAL DAILY
Qty: 90 TABLET | Refills: 3 | Status: SHIPPED | OUTPATIENT
Start: 2022-03-15

## 2022-03-15 NOTE — TELEPHONE ENCOUNTER
Rx Sent to [facility] - have also ordered lab work   Okay to do not fasting   May need to move appt tomorrow for a later date until labs are done unless it is not an HIV-related issue

## 2022-03-16 ENCOUNTER — TELEPHONE (OUTPATIENT)
Dept: INTERNAL MEDICINE | Facility: OTHER | Age: 27
End: 2022-03-16

## 2022-03-16 ENCOUNTER — OFFICE VISIT (OUTPATIENT)
Dept: INTERNAL MEDICINE | Facility: OTHER | Age: 27
End: 2022-03-16
Payer: MEDICAID

## 2022-03-16 VITALS
TEMPERATURE: 97.7 F | WEIGHT: 132.2 LBS | OXYGEN SATURATION: 96 % | SYSTOLIC BLOOD PRESSURE: 128 MMHG | DIASTOLIC BLOOD PRESSURE: 74 MMHG | BODY MASS INDEX: 20.11 KG/M2 | HEART RATE: 80 BPM

## 2022-03-16 DIAGNOSIS — D89.9 DISORDER OF IMMUNE SYSTEM (HCC): ICD-10-CM

## 2022-03-16 DIAGNOSIS — Z21 ASYMPTOMATIC HUMAN IMMUNODEFICIENCY VIRUS INFECTION (HCC): ICD-10-CM

## 2022-03-16 DIAGNOSIS — Z51.81 ENCOUNTER FOR THERAPEUTIC DRUG LEVEL MONITORING: ICD-10-CM

## 2022-03-16 DIAGNOSIS — Z79.899 OTHER LONG TERM (CURRENT) DRUG THERAPY: ICD-10-CM

## 2022-03-16 PROBLEM — A51.5 EARLY SYPHILIS, LATENT: Status: ACTIVE | Noted: 2020-12-18

## 2022-03-16 PROBLEM — Z00.00 ENCOUNTER FOR GENERAL ADULT MEDICAL EXAMINATION WITHOUT ABNORMAL FINDINGS: Status: ACTIVE | Noted: 2020-12-17

## 2022-03-16 PROBLEM — Z72.51 HIGH RISK SEXUAL BEHAVIOR: Status: ACTIVE | Noted: 2020-09-16

## 2022-03-16 PROBLEM — G43.109 OPHTHALMIC MIGRAINE: Status: ACTIVE | Noted: 2021-04-22

## 2022-03-16 PROCEDURE — 99214 OFFICE O/P EST MOD 30 MIN: CPT | Mod: GC | Performed by: STUDENT IN AN ORGANIZED HEALTH CARE EDUCATION/TRAINING PROGRAM

## 2022-03-16 ASSESSMENT — ANXIETY QUESTIONNAIRES
1. FEELING NERVOUS, ANXIOUS, OR ON EDGE: MORE THAN HALF THE DAYS
GAD7 TOTAL SCORE: 16
2. NOT BEING ABLE TO STOP OR CONTROL WORRYING: NEARLY EVERY DAY
5. BEING SO RESTLESS THAT IT IS HARD TO SIT STILL: MORE THAN HALF THE DAYS
6. BECOMING EASILY ANNOYED OR IRRITABLE: MORE THAN HALF THE DAYS
4. TROUBLE RELAXING: MORE THAN HALF THE DAYS
7. FEELING AFRAID AS IF SOMETHING AWFUL MIGHT HAPPEN: MORE THAN HALF THE DAYS
2. NOT BEING ABLE TO STOP OR CONTROL WORRYING: NEARLY EVERY DAY
5. BEING SO RESTLESS THAT IT IS HARD TO SIT STILL: MORE THAN HALF THE DAYS
2. NOT BEING ABLE TO STOP OR CONTROL WORRYING: NEARLY EVERY DAY
GAD7 TOTAL SCORE: 16
4. TROUBLE RELAXING: MORE THAN HALF THE DAYS
6. BECOMING EASILY ANNOYED OR IRRITABLE: MORE THAN HALF THE DAYS
5. BEING SO RESTLESS THAT IT IS HARD TO SIT STILL: MORE THAN HALF THE DAYS
IF YOU CHECKED OFF ANY PROBLEMS ON THIS QUESTIONNAIRE, HOW DIFFICULT HAVE THESE PROBLEMS MADE IT FOR YOU TO DO YOUR WORK, TAKE CARE OF THINGS AT HOME, OR GET ALONG WITH OTHER PEOPLE: VERY DIFFICULT
3. WORRYING TOO MUCH ABOUT DIFFERENT THINGS: NEARLY EVERY DAY
7. FEELING AFRAID AS IF SOMETHING AWFUL MIGHT HAPPEN: MORE THAN HALF THE DAYS
IF YOU CHECKED OFF ANY PROBLEMS ON THIS QUESTIONNAIRE, HOW DIFFICULT HAVE THESE PROBLEMS MADE IT FOR YOU TO DO YOUR WORK, TAKE CARE OF THINGS AT HOME, OR GET ALONG WITH OTHER PEOPLE: VERY DIFFICULT
GAD7 TOTAL SCORE: 16
3. WORRYING TOO MUCH ABOUT DIFFERENT THINGS: NEARLY EVERY DAY
IF YOU CHECKED OFF ANY PROBLEMS ON THIS QUESTIONNAIRE, HOW DIFFICULT HAVE THESE PROBLEMS MADE IT FOR YOU TO DO YOUR WORK, TAKE CARE OF THINGS AT HOME, OR GET ALONG WITH OTHER PEOPLE: VERY DIFFICULT
4. TROUBLE RELAXING: MORE THAN HALF THE DAYS
1. FEELING NERVOUS, ANXIOUS, OR ON EDGE: MORE THAN HALF THE DAYS
7. FEELING AFRAID AS IF SOMETHING AWFUL MIGHT HAPPEN: MORE THAN HALF THE DAYS
6. BECOMING EASILY ANNOYED OR IRRITABLE: MORE THAN HALF THE DAYS
3. WORRYING TOO MUCH ABOUT DIFFERENT THINGS: NEARLY EVERY DAY
1. FEELING NERVOUS, ANXIOUS, OR ON EDGE: MORE THAN HALF THE DAYS

## 2022-03-16 ASSESSMENT — PATIENT HEALTH QUESTIONNAIRE - PHQ9
SUM OF ALL RESPONSES TO PHQ QUESTIONS 1-9: 19
CLINICAL INTERPRETATION OF PHQ2 SCORE: 4
5. POOR APPETITE OR OVEREATING: 1 - SEVERAL DAYS

## 2022-03-16 ASSESSMENT — FIBROSIS 4 INDEX: FIB4 SCORE: 0.68

## 2022-03-16 NOTE — PATIENT INSTRUCTIONS
Please get your Viral Load and other labs done today prior to picking your prescription that was sent yesterday to [facility] pharmacy.    Have  Repeat viral load lab checked in May 1st week prior to your follow up appointment with Dr. Sierra in 2 months    Follow up in 2 months

## 2022-03-16 NOTE — TELEPHONE ENCOUNTER
DOCUMENTATION OF PAR STATUS:    1. Name of Medication & Dose: Biktarvy -25    2. Name of Prescription Coverage Company & phone #: [employer] [phone] id [id number]    3. Date Prior Auth Submitted: 03/16/2022    4. What information was given to obtain insurance decision? Spoke to [employer] rep Yu pending case  case number [id number]    5. Prior Auth Letter  Denied please see media scan denied    6. Action Taken: Pharmacy/Patient Notified: Yes

## 2022-03-17 NOTE — ASSESSMENT & PLAN NOTE
Currently, the patient is asymptomatic in regards to their HIV disease and not manifesting any opportunistic infections or complications of their HIV specific therapy.    Currently on Biktarvy cocktail for couple of years  Last CD4 count was 1091 and undetectable viral load from 12/2020  Missed medication for the last one week as he ran out  Plan:  - Check Viral Load today to monitor for rebound viremia as pt. is off medication for about a week.  - Prescription refilled and sent to pharmacy. Pt informs being told by pharmacy that prior auth is required, notified MA who will work on it  - Recheck Viral load again in 6 weeks to monitor while on therapy  - Followup in clinic in 8 weeks.

## 2022-03-17 NOTE — PROGRESS NOTES
Established Patient    Chief Complaint   Patient presents with   • HIV Positive/AIDS     Patient will be having labs done this week       HPI: Gulshan Rasheed is a 27 y.o. male with past medical history significant for asymptomatic HIV presents to the clinic for follow up visit.    HIV - He was established in our clinic in 2020, was seen again in 4/2021 and subsequently had no further follow up. He is currently on Biktarvy(bictegravir-emtricitabine) for the last couple of years. He informs missing his medication for the last one week as he ran out but otherwise compliant. Last CD4 count was 1091 and undetectable viral load from 12/2020. He was given lab slips at the time of his visit in 4/2021 but could not get them completed. He presents requesting for refill of his medications. He reports tolerating medication well with no side affects. He denies any oral thrush, rash, myalgias, fever/chills, headache, sore throat, fatigue or swollen lymph nodes. Labs were ordered yesterday and refill sent to pharmacy by Dr. Velasquez.      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 90 Tablet 3     No current facility-administered medications for this visit.       ROS: As per HPI. Additional pertinent systems as noted below.  Constitutional:  Negative for fever, chills   HENT:  Negative for ear pain, sore throat, runny nose   Cardiovascular:  Negative for chest pain, palpitations    Respiratory:  Negative for cough, sputum production, shortness of breath   Gastrointestinal: Negative for abdominal pain, nausea, vomiting, diarrhea, or blood in stools   Genitourinary: Negative for dysuria, flank pain and hematuria  Skin: Negative for rash, itching  Extremities: Negative for leg swelling   Neurologic: Negative for headaches, dizziness, seizures, weakness     /74 (BP Location: Left arm, Patient Position: Sitting, BP Cuff Size: Adult)   Pulse 80   Temp 36.5 °C (97.7 °F) (Temporal)   Wt 60 kg (132 lb 3.2 oz)   SpO2 96%   BMI 20.11 kg/m²     Physical Exam   Constitutional:  Well developed, well nourished. Not in acute distress   HENT:  Normocephalic, Atraumatic, Oropharynx is pink and moist. No oral exudates, Nose normal   Eyes:  EOMI, Conjunctiva normal, No discharge. PERRLA   Neck:  Normal range of motion, No cervical lymphadenopathy. No thyromegaly.   Cardiovascular:  Regular rate and rhythm, No pedal edema, Intact distal pulses   Respiratory: Clear to auscultation bilaterally, No use of accessory muscles   Gastrointestinal: Bowel sounds normal, Soft, No tenderness, No palpable masses/hepatosplenomegaly   Musculoskeletal: No cyanosis or clubbing, normal ROM     Note: I have reviewed all pertinent labs and diagnostic tests associated with this visit with specific comments listed under the assessment and plan below    Assessment and Plan  Asymptomatic HIV  Currently, the patient is asymptomatic in regards to their HIV disease and not manifesting any opportunistic infections or complications of their HIV specific therapy.    Currently on Biktarvy cocktail for couple of years  Last CD4 count was 1091 and undetectable viral load from 12/2020  Missed medication for the last one week as he ran out  Plan:  - Check Viral Load today to monitor for rebound viremia as pt. is off medication for about a week.  - Prescription refilled and sent to pharmacy. Pt informs being told by pharmacy that prior  auth is required, notified MA who will work on it  - Recheck Viral load again in 6 weeks to monitor while on therapy  - Followup in clinic in 8 weeks.    Other long term (current) drug therapy  Pt. currently is highly adherent with their HIV cocktail(Biktarvy) and is not experiencing any adverse, drug-related side effects or significant laboratory abnormalities.      T cell immunodeficiency defect  PE shows no signs of any opportunistic infections and CD4 count is stable above a level for which no drug prophylaxis is indicated to prevent opportunistic infections.      Followup: Return in about 2 months (around 5/16/2022).    Patient seen with attending.    Signed by: Ligia Sanchez M.D.    Please note that this dictation was created using voice recognition software. I have made every reasonable attempt to correct obvious errors, but I expect that there are errors of grammar and possibly content that I did not discover before finalizing the note.

## 2022-03-17 NOTE — ASSESSMENT & PLAN NOTE
PE shows no signs of any opportunistic infections and CD4 count is stable above a level for which no drug prophylaxis is indicated to prevent opportunistic infections.

## 2022-03-17 NOTE — ASSESSMENT & PLAN NOTE
Pt. currently is highly adherent with their HIV cocktail(Biktarvy) and is not experiencing any adverse, drug-related side effects or significant laboratory abnormalities.

## 2022-03-21 ENCOUNTER — TELEPHONE (OUTPATIENT)
Dept: INTERNAL MEDICINE | Facility: OTHER | Age: 27
End: 2022-03-21
Payer: MEDICAID

## 2022-03-21 NOTE — TELEPHONE ENCOUNTER
Called [facility]     No prior auth needed for Biktarvy     Ran medication again and states it is in the formulary - filled today

## 2023-05-21 ENCOUNTER — HOSPITAL ENCOUNTER (EMERGENCY)
Facility: MEDICAL CENTER | Age: 28
End: 2023-05-21
Attending: EMERGENCY MEDICINE
Payer: MEDICAID

## 2023-05-21 VITALS
HEART RATE: 85 BPM | HEIGHT: 68 IN | WEIGHT: 139.11 LBS | RESPIRATION RATE: 18 BRPM | DIASTOLIC BLOOD PRESSURE: 90 MMHG | BODY MASS INDEX: 21.08 KG/M2 | OXYGEN SATURATION: 95 % | TEMPERATURE: 97.2 F | SYSTOLIC BLOOD PRESSURE: 152 MMHG

## 2023-05-21 DIAGNOSIS — F41.9 ANXIETY: ICD-10-CM

## 2023-05-21 DIAGNOSIS — F43.21 SITUATIONAL DEPRESSION: ICD-10-CM

## 2023-05-21 LAB
AMPHET UR QL SCN: NEGATIVE
BARBITURATES UR QL SCN: NEGATIVE
BENZODIAZ UR QL SCN: NEGATIVE
BZE UR QL SCN: POSITIVE
CANNABINOIDS UR QL SCN: POSITIVE
FENTANYL UR QL: NEGATIVE
METHADONE UR QL SCN: NEGATIVE
OPIATES UR QL SCN: NEGATIVE
OXYCODONE UR QL SCN: NEGATIVE
PCP UR QL SCN: NEGATIVE
POC BREATHALIZER: 0.1 PERCENT (ref 0–0.01)
POC BREATHALIZER: 0.11 PERCENT (ref 0–0.01)
PROPOXYPH UR QL SCN: NEGATIVE

## 2023-05-21 PROCEDURE — 302970 POC BREATHALIZER: Performed by: EMERGENCY MEDICINE

## 2023-05-21 PROCEDURE — 80307 DRUG TEST PRSMV CHEM ANLYZR: CPT

## 2023-05-21 PROCEDURE — 90791 PSYCH DIAGNOSTIC EVALUATION: CPT

## 2023-05-21 PROCEDURE — 99285 EMERGENCY DEPT VISIT HI MDM: CPT

## 2023-05-21 PROCEDURE — 302970 POC BREATHALIZER

## 2023-05-21 ASSESSMENT — LIFESTYLE VARIABLES
DOES PATIENT WANT TO STOP DRINKING: NO
DO YOU DRINK ALCOHOL: NO

## 2023-05-21 NOTE — ED TRIAGE NOTES
Pt ambulated to triage with   Chief Complaint   Patient presents with    Psych Eval     Wants to talk to someone about his mental health.  Thoughts of harming himself with knife, reports some cutting, no obvious injuries     Pt here with friend for support.   Protocol ordered.  Pt to G36.
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
## 2023-05-21 NOTE — ED NOTES
Received orders for DC. VSS. Pt educated regarding resources provided by behavioral health. Encouraged patients to set up outpatient mental health services as well as a follow-up with PCP. Pt is tearful. Provided with emotional support. Ambulatory to lobby with steady gait.
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
## 2024-04-18 ENCOUNTER — HOSPITAL ENCOUNTER (EMERGENCY)
Facility: MEDICAL CENTER | Age: 29
End: 2024-04-19
Attending: EMERGENCY MEDICINE
Payer: MEDICAID

## 2024-04-18 DIAGNOSIS — F10.920 ALCOHOLIC INTOXICATION WITHOUT COMPLICATION (HCC): ICD-10-CM

## 2024-04-18 DIAGNOSIS — R45.851 SUICIDAL IDEATION: ICD-10-CM

## 2024-04-18 LAB
AMPHET UR QL SCN: NEGATIVE
BARBITURATES UR QL SCN: NEGATIVE
BENZODIAZ UR QL SCN: NEGATIVE
BZE UR QL SCN: NEGATIVE
CANNABINOIDS UR QL SCN: POSITIVE
FENTANYL UR QL: NEGATIVE
METHADONE UR QL SCN: NEGATIVE
OPIATES UR QL SCN: NEGATIVE
OXYCODONE UR QL SCN: NEGATIVE
PCP UR QL SCN: NEGATIVE
POC BREATHALIZER: 0.08 PERCENT (ref 0–0.01)
POC BREATHALIZER: 0.11 PERCENT (ref 0–0.01)
POC BREATHALIZER: 0.2 PERCENT (ref 0–0.01)
PROPOXYPH UR QL SCN: NEGATIVE

## 2024-04-18 PROCEDURE — 302970 POC BREATHALIZER

## 2024-04-18 PROCEDURE — 99285 EMERGENCY DEPT VISIT HI MDM: CPT

## 2024-04-18 PROCEDURE — 96372 THER/PROPH/DIAG INJ SC/IM: CPT

## 2024-04-18 PROCEDURE — 80307 DRUG TEST PRSMV CHEM ANLYZR: CPT

## 2024-04-18 PROCEDURE — 302970 POC BREATHALIZER: Performed by: EMERGENCY MEDICINE

## 2024-04-18 PROCEDURE — 700111 HCHG RX REV CODE 636 W/ 250 OVERRIDE (IP): Mod: JZ,UD | Performed by: EMERGENCY MEDICINE

## 2024-04-18 RX ORDER — HALOPERIDOL 5 MG/ML
5 INJECTION INTRAMUSCULAR
Status: COMPLETED | OUTPATIENT
Start: 2024-04-18 | End: 2024-04-18

## 2024-04-18 RX ORDER — LORAZEPAM 2 MG/ML
2 INJECTION INTRAMUSCULAR
Status: COMPLETED | OUTPATIENT
Start: 2024-04-18 | End: 2024-04-18

## 2024-04-18 RX ORDER — DIPHENHYDRAMINE HYDROCHLORIDE 50 MG/ML
50 INJECTION INTRAMUSCULAR; INTRAVENOUS
Status: COMPLETED | OUTPATIENT
Start: 2024-04-18 | End: 2024-04-18

## 2024-04-18 RX ADMIN — LORAZEPAM 2 MG: 2 INJECTION INTRAMUSCULAR; INTRAVENOUS at 16:52

## 2024-04-18 RX ADMIN — HALOPERIDOL LACTATE 5 MG: 5 INJECTION, SOLUTION INTRAMUSCULAR at 16:52

## 2024-04-18 RX ADMIN — DIPHENHYDRAMINE HYDROCHLORIDE 50 MG: 50 INJECTION, SOLUTION INTRAMUSCULAR; INTRAVENOUS at 16:52

## 2024-04-18 NOTE — ED NOTES
Patient escalating and yelling in room, pt not redirectable and yelling profanities at the staff.

## 2024-04-18 NOTE — ED NOTES
Patient transferred to [facility], restraints from [facility] removed. Patient cooperative with this RN. ERP at bedside. Pt placed on L2K by PD, per ERP patient is okay to stay in his personal clothes until he is more calm. Patient pockets searched and there is nothing in his pockets. All other belongings removed from the room. All equipment removed.
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
## 2024-04-19 VITALS
OXYGEN SATURATION: 98 % | SYSTOLIC BLOOD PRESSURE: 124 MMHG | WEIGHT: 139 LBS | TEMPERATURE: 98.6 F | HEIGHT: 68 IN | RESPIRATION RATE: 16 BRPM | HEART RATE: 78 BPM | BODY MASS INDEX: 21.07 KG/M2 | DIASTOLIC BLOOD PRESSURE: 86 MMHG

## 2024-04-19 PROCEDURE — 90791 PSYCH DIAGNOSTIC EVALUATION: CPT

## 2024-04-19 NOTE — ED NOTES
BS report from Chana RN  Pt resting but restless, no longer yelling but remains irritable  Pt is in 4-point restraints d/t safety and behavioral concerns previously which brought him in   Pt has severe suicidal ideations and on L2K  Pt has 1:1 sitter who is in direct, unobstructed 1:1 view of pt for safety  This RN making frequent rounds for safety   Breathing even and unlabored, CMS intact   Warm blanket provided but restraints and head are visible     Security contacted for belongings search, x2 bags

## 2024-04-19 NOTE — ED NOTES
Sitter remains outside of room in direct, unobstructed 1:1 view for safety and obs  Pt remains agitated and restless, moving all around

## 2024-04-19 NOTE — ED NOTES
Bedside report to RORY Epperson. Plan of care discussed with patient. Bed locked and in lowest position. Appropriate fall precautions in place. No distress noted. This RN removed from care. 1:1 sitter in direct line of sight.

## 2024-04-19 NOTE — ED NOTES
Checked on bed, with unlabored respirations. No safety risk noted  Sleeping  Sitter - 1:1 with continuous visual monitoring by Trained Personnel  Continued safety precaution  Timrkathy in low position, side rail up for pt safety.   No needs identified at the moment

## 2024-04-19 NOTE — ED NOTES
Patient trying to lift the bed up with his restrained legs. Restraints readjusted and ankles checked for circulation. CMS intact. 1:1 sitter in direct line of sight.    Problem: PAIN - ADULT  Goal: Verbalizes/displays adequate comfort level or baseline comfort level  Description: Interventions:  - Encourage patient to monitor pain and request assistance  - Assess pain using appropriate pain scale  - Administer analgesics based on type and severity of pain and evaluate response  - Implement non-pharmacological measures as appropriate and evaluate response  - Consider cultural and social influences on pain and pain management  - Notify physician/advanced practitioner if interventions unsuccessful or patient reports new pain  Outcome: Progressing

## 2024-04-19 NOTE — ED NOTES
Pt intermittently resting and then restless/agitated  Sitter remains outside of room in direct/unobstructed 1:1 view for safety

## 2024-04-19 NOTE — ED NOTES
Pt still pending alert team chelly  Discussed w/ ERP about mom being allotted to come visit as she has been waiting in the lobby for a while about the eval and if she is allowed to come back yet  ERP and this RN agree mom can have a quick visit as mom is appropriate     This RN brought mom to BS

## 2024-04-19 NOTE — ED NOTES
Pt awake and terful, requesting to leave and discussed w/ pt parameters of L2K and not being able to leave. All L2K precautions in place  Pt reports restraints are uncomfortable and offered to remove x2 per policy and concerns from previous behavior  Pt cooperative at this time and ok w/ this plan    Discussed thoroughly w/ pt about proper behavior and behavior from earlier/ safety concerns can constitute full restraints and/or security intervention again  Pt agreeable to plan and verbalizes he will not be hostile or combative and will remain calm and cooperative    Discussed POC of being sober enough to see alert team and eval constitutes plan from there    Security at BS

## 2024-04-19 NOTE — DISCHARGE SUMMARY
"  ED Observation Discharge Summary    Patient:Gulshan Rasheed  Patient : 1995  Patient MRN: [id number]  Patient PCP: Murali Begum M.D.    Admit Date: 2024  Discharge Date and Time: 24 2:53 AM  Discharge Diagnosis: Alcohol intoxication and suicidal ideations  Discharge Attending: Edgar Stanton D.O.  Discharge Service: ED Observation    ED Course  Gulshan is a 29 y.o. male who was evaluated at [facility] for evaluation of suicidal ideations as well as acute alcohol intoxication.  He was initially agitated making suicidal complaints, he did require restraints, he was given the sedation for patient and staff safety and observed in the emergency department for 11 hours.  Afterwards the patient had achieved clinical sobriety was awake alert answering questions appropriately with no further complaints and was able to contract for safety.  He denied any SI HI auditory visual hallucinations.  He was evaluated by the alert team and we agree at this point he does not meet criteria for a legal hold, thus he was discharged in stable condition.  He was provided outpatient psychiatric resources, return precautions were discussed and he was discharged in stable condition    Discharge Exam:  /55   Pulse 79   Temp 37.3 °C (99.2 °F) (Temporal)   Resp 15   Ht 1.727 m (5' 8\")   Wt 63 kg (139 lb)   SpO2 92%   BMI 21.13 kg/m² .    Constitutional: Awake and alert. Nontoxic  HENT:  Grossly normal  Eyes: Grossly normal  Neck: Normal range of motion  Cardiovascular: Normal heart rate   Thorax & Lungs: No respiratory distress  Abdomen: Nontender  Skin:  No pathologic rash.   Extremities: Well perfused  Psychiatric: Affect normal    Labs  Results for orders placed or performed during the hospital encounter of 24   Urine Drug Screen   Result Value Ref Range    Amphetamines Urine Negative Negative    Barbiturates Negative Negative    Benzodiazepines Negative Negative    Cocaine Metabolite Negative " Negative    Fentanyl, Urine Negative Negative    Methadone Negative Negative    Opiates Negative Negative    Oxycodone Negative Negative    Phencyclidine -Pcp Negative Negative    Propoxyphene Negative Negative    Cannabinoid Metab Positive (A) Negative   POC BREATHALIZER   Result Value Ref Range    POC Breathalizer 0.202 (A) 0.00 - 0.01 Percent   POC BREATHALIZER   Result Value Ref Range    POC Breathalizer 0.113 (A) 0.00 - 0.01 Percent   POC BREATHALIZER   Result Value Ref Range    POC Breathalizer 0.08 (A) 0.00 - 0.01 Percent       Radiology  No orders to display       Medications:   New Prescriptions    No medications on file       My final assessment includes suicidal ideations resolved  2.  Alcohol intoxication  Upon Reevaluation, the patient's condition has: Improved; and will be discharged.    Patient discharged from ED Observation status at 0300 (Time) 4/19 (Date).     Total time spent on this ED Observation discharge encounter is < 30 Minutes    Electronically signed by: dEgar Stanton D.O., 4/19/2024 2:53 AM

## 2024-04-19 NOTE — ED NOTES
Mom heading home and would like to be updated, Lulu at [phone]  Pt updated on POC, eval from alert team     Sitter remains outside of room in direct, unobstructed 1:1 view for safety

## 2024-04-19 NOTE — ED NOTES
Pt assisted w/ drinking water, requesting arms to change for comfort  Security contacted to switch arms from L high to low and R low to high

## 2024-04-19 NOTE — ED NOTES
Pt requesting restraints removed and discussed thoroughly behavioral expectations of having them off and security will intervene if pt becomes aggressive w/ staff again  Pt verbalizes understanding and verbalizes he will behave and cooperate     Security at BS
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
## 2024-04-19 NOTE — ED NOTES
Checked on bed, with unlabored respirations. No safety risk noted  Sleeping comfortably on gurney   Sitter - 1:1 with continuous visual monitoring by Trained Personnel  Continued safety precaution  Qing in low position, side rail up for pt safety.   No needs identified at the moment

## 2024-04-19 NOTE — ED NOTES
All/ the rest of restraints removed and discontinued, pt continues to verbalize understanding of behavior expectations     Sitter remains outside of room in direct/unobstructed 1:1 view for safety

## 2024-04-19 NOTE — ED NOTES
"Gulshan Rasheed has been discharged from the Emergency Room.    pt in possession of belongings.    Discharge instructions, which include signs and symptoms to monitor patient for, as well as detailed information regarding Suicidal Ideation provided.  Patient verbalizes understanding of follow up care and medication management. All questions and concerns addressed at this time.     Patient provided with education on when to return to the ER and verbally understands with no concerns. Patient advised on setting up MyChart and information provided about patient survey.  Patient leaves ER in no apparent distress. This RN provided education regarding returning to the ER for any new concerns or changes in patient's condition.      /86   Pulse 78   Temp 37 °C (98.6 °F)   Resp 16   Ht 1.727 m (5' 8\")   Wt 63 kg (139 lb)   SpO2 98%   BMI 21.13 kg/m²   "

## 2024-04-19 NOTE — ED NOTES
Bedside report received from off going RN Michelle, assumed care of patient.  POC discussed with patient. Call light within reach, all needs addressed at this time.       Fall risk interventions in place: Move the patient closer to the nurse's station, Patient's personal possessions are with in their safe reach, Place socks on patient, Give patient urinal if applicable, Keep floor surfaces clean and dry, Accompanied to restroom, and Human-sitter if tele-sitter fails (all applicable per [place] Fall risk assessment)   Continuous monitoring: Not Applicable   IVF/IV medications: Not Applicable   Oxygen: Room Air  Bedside sitter: Pt on L2k SI with 1:1 sitter Miguelina (name)  Isolation: Not Applicable

## 2024-04-19 NOTE — ED NOTES
Pt yanking at restraints wanting them all off and becoming agitated, pt reminded again of behavior expectations

## 2025-04-11 NOTE — ED NOTES
Blood pressure remains elevated  Increase losartan 100 mg daily, take 2 pills in the morning, if works with change meds to 100 mg pill  BP log  RTC in 4 weeks with log  Call sooner for any new problems    Rounded on. Stating he wants to leave. Agitated. Currently waiting for alcohol to metabolize. Pt informed by Kwaku VALADEZ that he needs to stay for evaluation.